# Patient Record
Sex: MALE | Race: WHITE | NOT HISPANIC OR LATINO | Employment: FULL TIME | URBAN - METROPOLITAN AREA
[De-identification: names, ages, dates, MRNs, and addresses within clinical notes are randomized per-mention and may not be internally consistent; named-entity substitution may affect disease eponyms.]

---

## 2017-06-26 ENCOUNTER — OFFICE VISIT (OUTPATIENT)
Dept: URGENT CARE | Facility: PHYSICIAN GROUP | Age: 67
End: 2017-06-26
Payer: MEDICARE

## 2017-06-26 VITALS
BODY MASS INDEX: 26.96 KG/M2 | HEART RATE: 89 BPM | OXYGEN SATURATION: 94 % | TEMPERATURE: 98.6 F | SYSTOLIC BLOOD PRESSURE: 158 MMHG | WEIGHT: 182 LBS | HEIGHT: 69 IN | DIASTOLIC BLOOD PRESSURE: 90 MMHG

## 2017-06-26 DIAGNOSIS — R07.9 CHEST PAIN, UNSPECIFIED TYPE: ICD-10-CM

## 2017-06-26 DIAGNOSIS — F17.200 SMOKER: ICD-10-CM

## 2017-06-26 DIAGNOSIS — I25.10 CAD, MULTIPLE VESSEL: ICD-10-CM

## 2017-06-26 DIAGNOSIS — Z86.39 HISTORY OF DIABETES MELLITUS, TYPE II: ICD-10-CM

## 2017-06-26 DIAGNOSIS — R05.9 COUGH: ICD-10-CM

## 2017-06-26 PROCEDURE — 99203 OFFICE O/P NEW LOW 30 MIN: CPT | Performed by: PHYSICIAN ASSISTANT

## 2017-06-26 RX ORDER — GLIPIZIDE 10 MG/1
10 TABLET, FILM COATED, EXTENDED RELEASE ORAL DAILY
COMMUNITY
Start: 2017-05-26

## 2017-06-26 RX ORDER — ATORVASTATIN CALCIUM 40 MG/1
40 TABLET, FILM COATED ORAL DAILY
COMMUNITY
Start: 2017-04-14

## 2017-06-26 RX ORDER — OXYCODONE HYDROCHLORIDE 5 MG/1
5 TABLET ORAL EVERY 4 HOURS PRN
COMMUNITY

## 2017-06-26 ASSESSMENT — ENCOUNTER SYMPTOMS
SPUTUM PRODUCTION: 1
PALPITATIONS: 0
NECK PAIN: 0
HEADACHES: 0
EYE DISCHARGE: 0
ABDOMINAL PAIN: 0
COUGH: 1
ORTHOPNEA: 0
EXERTIONAL CHEST PRESSURE: 1
FOCAL WEAKNESS: 0
HEMOPTYSIS: 0
DIARRHEA: 0
FEVER: 0
IRREGULAR HEARTBEAT: 0
EYE REDNESS: 0
SHORTNESS OF BREATH: 1
LOWER EXTREMITY EDEMA: 0
CHILLS: 0
MYALGIAS: 1
BACK PAIN: 0
SORE THROAT: 0
WHEEZING: 1
VOMITING: 0
DIZZINESS: 0

## 2017-06-26 NOTE — Clinical Note
June 26, 2017         Patient: Reuben Perera   YOB: 1950   Date of Visit: 6/26/2017           To Whom it May Concern:    Reuben Perera was seen in my clinic on 6/26/2017. Patient was evaluated at urgent care today- due to symptoms patient was sent to the hospital.     If you have any questions or concerns, please don't hesitate to call.        Sincerely,           Aryan Solis PA-C  Electronically Signed

## 2017-06-26 NOTE — PROGRESS NOTES
Subjective:      Reuben Perera is a 67 y.o. male who presents with Chest Pain          Pt is 66 y/o male who presents with chest pain and heaviness for the last 6 days. Pt. Reports productive cough as well which makes his pain worse. Pt. Does have significant hx of CAD with stent placement, tobacco use, and hx of DMII. Pt. Is not from here and cardiologist is in [place]. Pt. Denies any fevers, chills, or hx of same.     Chest Pain   This is a new problem. Episode onset: 6 days ago. The onset quality is gradual. The problem occurs constantly. The problem has been waxing and waning. Pain location: Left side of chest. The pain is at a severity of 5/10. The quality of the pain is described as heavy. Associated symptoms include a cough, exertional chest pressure, malaise/fatigue, shortness of breath and sputum production. Pertinent negatives include no abdominal pain, back pain, dizziness, fever, headaches, hemoptysis, irregular heartbeat, lower extremity edema, orthopnea, palpitations or vomiting. The pain is aggravated by breathing and coughing. Risk factors include male gender, being elderly and smoking/tobacco exposure.       Review of Systems   Constitutional: Positive for malaise/fatigue. Negative for fever and chills.   HENT: Negative for congestion, ear discharge and sore throat.    Eyes: Negative for discharge and redness.   Respiratory: Positive for cough, sputum production, shortness of breath and wheezing. Negative for hemoptysis.    Cardiovascular: Positive for chest pain. Negative for palpitations, orthopnea and leg swelling.   Gastrointestinal: Negative for vomiting, abdominal pain and diarrhea.   Genitourinary: Negative for dysuria and urgency.   Musculoskeletal: Positive for myalgias. Negative for back pain and neck pain.   Skin: Negative for itching and rash.   Neurological: Negative for dizziness, focal weakness and headaches.          Objective:     /90 mmHg  Pulse 89  Temp(Src) 37 °C (98.6  "°F)  Ht 1.753 m (5' 9\")  Wt 82.555 kg (182 lb)  BMI 26.86 kg/m2  SpO2 94%   PMH:  has a past medical history of Diabetes (CMS-HCC); Heart attack (CMS-HCC); Hyperlipidemia; and Hypertension.  MEDS:   Current outpatient prescriptions:   •  oxycodone immediate-release (ROXICODONE) 5 MG Tab, Take 5 mg by mouth every four hours as needed for Severe Pain., Disp: , Rfl:   •  atorvastatin (LIPITOR) 40 MG Tab, Take 40 mg by mouth every day., Disp: , Rfl:   •  glipiZIDE SR (GLUCOTROL) 10 MG TABLET SR 24 HR, Take 10 mg by mouth every day., Disp: , Rfl:   ALLERGIES:   Allergies   Allergen Reactions   • Codeine      SURGHX:   Past Surgical History   Procedure Laterality Date   • Multiple coronary artery bypass       SOCHX:  reports that he has been smoking.  He does not have any smokeless tobacco history on file.  FH: Family history was reviewed, no pertinent findings to report    Physical Exam   Constitutional: He is oriented to person, place, and time. He appears well-developed and well-nourished.   HENT:   Head: Normocephalic and atraumatic.   Mouth/Throat: No oropharyngeal exudate.   Ears- Canals clear- TM- with clear fluid effusions bilaterally.   Pos. PND, with slight erythema- without tonsillar edema or exudate.   Mild discharge noted bilaterally- to nares.      Eyes: EOM are normal. Pupils are equal, round, and reactive to light.   Neck: Normal range of motion. Neck supple.   Cardiovascular: Normal rate and regular rhythm.    No murmur heard.  Pulmonary/Chest: No respiratory distress. He has wheezes.   Insp. And exp. Wheezing throughout. Noted rhonchi- right middle lobe.    Musculoskeletal: Normal range of motion. He exhibits no tenderness.   Lymphadenopathy:     He has no cervical adenopathy.   Neurological: He is alert and oriented to person, place, and time.   Skin: Skin is warm. No rash noted.   Psychiatric: He has a normal mood and affect. His behavior is normal.   Vitals reviewed.            EKG: NSR: at a rate " of 82, noted t wave inversion in leads II and III, Nonspecific ST changes in V2-V3. No old to compare.   Assessment/Plan:     1. Chest pain, unspecified type  2. History of diabetes mellitus, type II  3. Smoker  4. Cough  5. CAD, multiple vessel    Due to risk factors, abnormal EKG- pt. Requires a cardiac work up. At this time patient is in agreement to have higher level of care and is agreeable to go to [facility] for further evaluation.   Spoke with [facility] ER provider made him aware of my concern.   Pt. Was given a copy of his EKG and left private vehicle up the street to [facility].   While most likely symptoms are due to lung etiology- again with hx of stent placement and continued smoking, and hx. Of DMII.- cardiac r/o is necessary.

## 2017-06-26 NOTE — MR AVS SNAPSHOT
"        Reuben Perera   2017 9:30 AM   Office Visit   MRN: [id number]    Department:  [facility]   Dept Phone:  [phone]    Description:  Male : 1950   Provider:  Aryan Solis PA-C           Reason for Visit     Chest Pain chest congestion      Allergies as of 2017     Allergen Noted Reactions    Codeine 2017         You were diagnosed with     Chest pain, unspecified type   [6042946]       History of diabetes mellitus, type II   [226078]       Smoker   [552591]       Cough   [786.2.ICD-9-CM]       CAD, multiple vessel   [996396]         Vital Signs     Blood Pressure Pulse Temperature Height Weight Body Mass Index    158/90 mmHg 89 37 °C (98.6 °F) 1.753 m (5' 9\") 82.555 kg (182 lb) 26.86 kg/m2    Oxygen Saturation Smoking Status                94% Current Every Day Smoker          Basic Information     Date Of Birth Sex Race Ethnicity Preferred Language    1950 Male White Non- English      Health Maintenance     Patient has no pending health maintenance at this time      Current Immunizations     No immunizations on file.      Below and/or attached are the medications your provider expects you to take. Review all of your home medications and newly ordered medications with your provider and/or pharmacist. Follow medication instructions as directed by your provider and/or pharmacist. Please keep your medication list with you and share with your provider. Update the information when medications are discontinued, doses are changed, or new medications (including over-the-counter products) are added; and carry medication information at all times in the event of emergency situations     Allergies:  CODEINE - (reactions not documented)               Medications  Valid as of: 2017 - 10:40 AM    Generic Name Brand Name Tablet Size Instructions for use    Atorvastatin Calcium (Tab) LIPITOR 40 MG Take 40 mg by mouth every day.        GlipiZIDE (TABLET SR 24 HR) " GLUCOTROL 10 MG Take 10 mg by mouth every day.        OxyCODONE HCl (Tab) ROXICODONE 5 MG Take 5 mg by mouth every four hours as needed for Severe Pain.        .                 Medicines prescribed today were sent to:     None      Medication refill instructions:       If your prescription bottle indicates you have medication refills left, it is not necessary to call your provider’s office. Please contact your pharmacy and they will refill your medication.    If your prescription bottle indicates you do not have any refills left, you may request refills at any time through one of the following ways: The online [employer] system (except Urgent Care), by calling your provider’s office, or by asking your pharmacy to contact your provider’s office with a refill request. Medication refills are processed only during regular business hours and may not be available until the next business day. Your provider may request additional information or to have a follow-up visit with you prior to refilling your medication.   *Please Note: Medication refills are assigned a new Rx number when refilled electronically. Your pharmacy may indicate that no refills were authorized even though a new prescription for the same medication is available at the pharmacy. Please request the medicine by name with the pharmacy before contacting your provider for a refill.           [employer] Access Code: [email or web address]  Expires: 7/26/2017 10:40 AM    Your email address is not on file at [facility].  Email Addresses are required for you to sign up for [employer], please contact [phone] to verify your personal information and to provide your email address prior to attempting to register for [employer].    Reuben Perera  [address] [place] [address]    [employer]  A secure, online tool to manage your health information     [facility]’s [employer]® is a secure, online tool that connects you to your personalized health information from the  privacy of your home -- day or night - making it very easy for you to manage your healthcare. Once the activation process is completed, you can even access your medical information using the [employer] kristin, which is available for free in the [employer] Kristin store or Google Play store.     To learn more about [employer], visit [email or web address]    There are two levels of access available (as shown below):   My Chart Features  [facility]  Urgent  Care Non-[facility] Doctor   Email your healthcare team securely and privately 24/7 X X X    Manage appointments: schedule your next appointment; view details of past/upcoming appointments X      Request prescription refills. X      View recent personal medical records, including lab and immunizations X X X X   View health record, including health history, allergies, medications X X X X   Read reports about your outpatient visits, procedures, consult and ER notes X X X X   See your discharge summary, which is a recap of your hospital and/or ER visit that includes your diagnosis, lab results, and care plan X X  X     How to register for [employer]:  Once your e-mail address has been verified, follow the following steps to sign up for [employer].     1. Go to  [email or web address]  2. Click on the Sign Up Now box, which takes you to the New Member Sign Up page. You will need to provide the following information:  a. Enter your [employer] Access Code exactly as it appears at the top of this page. (You will not need to use this code after you’ve completed the sign-up process. If you do not sign up before the expiration date, you must request a new code.)   b. Enter your date of birth.   c. Enter your home email address.   d. Click Submit, and follow the next screen’s instructions.  3. Create a TDXt ID. This will be your [employer] login ID and cannot be changed, so think of one that is secure and easy to remember.  4. Create a [employer]  password. You can change your password at any time.  5. Enter your Password Reset Question and Answer. This can be used at a later time if you forget your password.   6. Enter your e-mail address. This allows you to receive e-mail notifications when new information is available in [employer].  7. Click Sign Up. You can now view your health information.    For assistance activating your [employer] account, call [phone]         Quit Tobacco Information     Do you want to quit using tobacco?    Quitting tobacco decreases risks of cancer, heart and lung disease, increases life expectancy, improves sense of taste and smell, and increases spending money, among other benefits.    If you are thinking about quitting, we can help.  • [facility] Quit Tobacco Program: 637.529.1523  o Program occurs weekly for four weeks and includes pharmacist consultation on products to support quitting smoking or chewing tobacco. A provider referral is needed for pharmacist consultation.  • Tobacco Users Help Hotline: 3-800-QUIT-NOW (072-5666) or https://[place].quitlogix.org/  o Free, confidential telephone and online coaching for [place] residents. Sessions are designed on a schedule that is convenient for you. Eligible clients receive free nicotine replacement therapy.  • Nationally: www.smokefree.gov  o Information and professional assistance to support both immediate and long-term needs as you become, and remain, a non-smoker. Smokefree.gov allows you to choose the help that best fits your needs.